# Patient Record
Sex: FEMALE | Race: OTHER | HISPANIC OR LATINO | URBAN - METROPOLITAN AREA
[De-identification: names, ages, dates, MRNs, and addresses within clinical notes are randomized per-mention and may not be internally consistent; named-entity substitution may affect disease eponyms.]

---

## 2019-12-18 ENCOUNTER — EMERGENCY (EMERGENCY)
Facility: HOSPITAL | Age: 24
LOS: 1 days | Discharge: HOME | End: 2019-12-18
Attending: STUDENT IN AN ORGANIZED HEALTH CARE EDUCATION/TRAINING PROGRAM
Payer: COMMERCIAL

## 2019-12-18 VITALS
SYSTOLIC BLOOD PRESSURE: 118 MMHG | WEIGHT: 149.91 LBS | HEART RATE: 93 BPM | OXYGEN SATURATION: 99 % | RESPIRATION RATE: 18 BRPM | TEMPERATURE: 98 F | DIASTOLIC BLOOD PRESSURE: 71 MMHG | HEIGHT: 67 IN

## 2019-12-18 PROCEDURE — 99283 EMERGENCY DEPT VISIT LOW MDM: CPT | Mod: 25

## 2019-12-18 PROCEDURE — 96372 THER/PROPH/DIAG INJ SC/IM: CPT

## 2019-12-18 PROCEDURE — 99285 EMERGENCY DEPT VISIT HI MDM: CPT

## 2019-12-18 RX ORDER — DEXAMETHASONE 0.5 MG/5ML
10 ELIXIR ORAL ONCE
Refills: 0 | Status: COMPLETED | OUTPATIENT
Start: 2019-12-18 | End: 2019-12-18

## 2019-12-18 RX ADMIN — Medication 500 MILLIGRAM(S): at 14:51

## 2019-12-18 RX ADMIN — Medication 1 TABLET(S): at 14:50

## 2019-12-18 RX ADMIN — Medication 10 MILLIGRAM(S): at 14:51

## 2019-12-18 NOTE — ED PROVIDER NOTE - CLINICAL SUMMARY MEDICAL DECISION MAKING FREE TEXT BOX
23 y/o F presents to ED complaining of sore throat. Vitals stable. Exam significant for exudates on lymph nodes. Possible fever despite no fever here in the ER since patient took medications at home. Will treat for strep and laryngitis.

## 2019-12-18 NOTE — ED PROVIDER NOTE - PATIENT PORTAL LINK FT
You can access the FollowMyHealth Patient Portal offered by [facility] by registering at the following website: [email or web address]. By joining [employer]’s FollowMyHealth portal, you will also be able to view your health information using other applications (apps) compatible with our system.

## 2019-12-18 NOTE — ED PROVIDER NOTE - OBJECTIVE STATEMENT
23 y/o F presents to ED complaining of throat pain and voice hoarseness x2d. Pt states she also had a fever and took medications for it at 3p yesterday and today. Pt adds she has abdominal pain. Pt denies nausea, vomiting or any other complaints. NKDA.

## 2020-10-01 NOTE — ED ADULT TRIAGE NOTE - HEIGHT IN CM
DISPLAY PLAN FREE TEXT DISPLAY PLAN FREE TEXT DISPLAY PLAN FREE TEXT DISPLAY PLAN FREE TEXT DISPLAY PLAN FREE TEXT DISPLAY PLAN FREE TEXT DISPLAY PLAN FREE TEXT 170.18 DISPLAY PLAN FREE TEXT

## 2020-11-27 ENCOUNTER — EMERGENCY (EMERGENCY)
Facility: HOSPITAL | Age: 25
LOS: 1 days | Discharge: HOME | End: 2020-11-27
Attending: EMERGENCY MEDICINE
Payer: COMMERCIAL

## 2020-11-27 VITALS
HEART RATE: 71 BPM | OXYGEN SATURATION: 99 % | RESPIRATION RATE: 18 BRPM | SYSTOLIC BLOOD PRESSURE: 103 MMHG | DIASTOLIC BLOOD PRESSURE: 80 MMHG

## 2020-11-27 VITALS
DIASTOLIC BLOOD PRESSURE: 70 MMHG | RESPIRATION RATE: 18 BRPM | HEART RATE: 75 BPM | TEMPERATURE: 98 F | HEIGHT: 67 IN | OXYGEN SATURATION: 99 % | SYSTOLIC BLOOD PRESSURE: 104 MMHG | WEIGHT: 141.1 LBS

## 2020-11-27 PROBLEM — Z78.9 OTHER SPECIFIED HEALTH STATUS: Chronic | Status: ACTIVE | Noted: 2019-12-18

## 2020-11-27 LAB
APPEARANCE UR: CLEAR — SIGNIFICANT CHANGE UP
BILIRUB UR-MCNC: NEGATIVE — SIGNIFICANT CHANGE UP
COLOR SPEC: YELLOW — SIGNIFICANT CHANGE UP
DIFF PNL FLD: NEGATIVE — SIGNIFICANT CHANGE UP
GLUCOSE UR QL: NEGATIVE — SIGNIFICANT CHANGE UP
HCG UR QL: NEGATIVE — SIGNIFICANT CHANGE UP
KETONES UR-MCNC: NEGATIVE — SIGNIFICANT CHANGE UP
LEUKOCYTE ESTERASE UR-ACNC: NEGATIVE — SIGNIFICANT CHANGE UP
NITRITE UR-MCNC: NEGATIVE — SIGNIFICANT CHANGE UP
PH UR: 7 — SIGNIFICANT CHANGE UP (ref 5–8)
PROT UR-MCNC: NEGATIVE — SIGNIFICANT CHANGE UP
RAPID RVP RESULT: DETECTED
SARS-COV-2 RNA SPEC QL NAA+PROBE: DETECTED
SP GR SPEC: 1 — LOW (ref 1.01–1.02)
UROBILINOGEN FLD QL: NEGATIVE — SIGNIFICANT CHANGE UP

## 2020-11-27 PROCEDURE — 81025 URINE PREGNANCY TEST: CPT

## 2020-11-27 PROCEDURE — 0225U NFCT DS DNA&RNA 21 SARSCOV2: CPT

## 2020-11-27 PROCEDURE — 99283 EMERGENCY DEPT VISIT LOW MDM: CPT

## 2020-11-27 PROCEDURE — 81003 URINALYSIS AUTO W/O SCOPE: CPT

## 2020-11-27 PROCEDURE — 87086 URINE CULTURE/COLONY COUNT: CPT

## 2020-11-27 RX ORDER — KETOROLAC TROMETHAMINE 30 MG/ML
15 SYRINGE (ML) INJECTION ONCE
Refills: 0 | Status: DISCONTINUED | OUTPATIENT
Start: 2020-11-27 | End: 2020-11-27

## 2020-11-27 RX ORDER — ACETAMINOPHEN 500 MG
975 TABLET ORAL ONCE
Refills: 0 | Status: COMPLETED | OUTPATIENT
Start: 2020-11-27 | End: 2020-11-27

## 2020-11-27 RX ORDER — SODIUM CHLORIDE 9 MG/ML
1000 INJECTION INTRAMUSCULAR; INTRAVENOUS; SUBCUTANEOUS ONCE
Refills: 0 | Status: COMPLETED | OUTPATIENT
Start: 2020-11-27 | End: 2020-11-27

## 2020-11-27 RX ADMIN — Medication 975 MILLIGRAM(S): at 14:31

## 2020-11-27 RX ADMIN — SODIUM CHLORIDE 1000 MILLILITER(S): 9 INJECTION INTRAMUSCULAR; INTRAVENOUS; SUBCUTANEOUS at 14:31

## 2020-11-27 NOTE — ED PROVIDER NOTE - PROGRESS NOTE DETAILS
Expressing relief, will accept text covid results. Pt is well appearing walking with steady gait, stable for discharge and follow up without fail with medical doctor. I had a detailed discussion with the patient and/or guardian regarding the historical points, exam findings, and any diagnostic results supporting the discharge diagnosis. Pt educated on care and need for follow up. Strict return instructions and red flag signs and symptoms discussed with patient. Questions answered. Pt shows understanding of discharge information and agrees to follow.

## 2020-11-27 NOTE — ED PROVIDER NOTE - PHYSICAL EXAMINATION
Lungs CTA with equal chest rise, speaking in full sentences no increased work of breathing,   PERRL, EOMI, no nystagmus. CN II-XII intact.   Abd soft NT ND with +BS x 4 quads.

## 2020-11-27 NOTE — ED PROVIDER NOTE - CLINICAL SUMMARY MEDICAL DECISION MAKING FREE TEXT BOX
Based on exam and history possible COVID-19, will RVP swab, give fluids, AU/U-preg, analgesia, reassess

## 2020-11-27 NOTE — ED PROVIDER NOTE - PATIENT PORTAL LINK FT
You can access the FollowMyHealth Patient Portal offered by [facility] by registering at the following website: [email or web address]. By joining [employer]’s FollowMyHealth portal, you will also be able to view your health information using other applications (apps) compatible with our system.

## 2020-11-27 NOTE — ED ADULT NURSE NOTE - ED CARDIAC RHYTHM
What Type Of Note Output Would You Prefer (Optional)?: Standard Output How Severe Are Your Spot(S)?: mild Have Your Spot(S) Been Treated In The Past?: has not been treated Hpi Title: Evaluation of Skin Lesions regular

## 2020-11-27 NOTE — ED PROVIDER NOTE - ATTENDING CONTRIBUTION TO CARE
I completed an independent physical examination.   I have signed out the follow up of any pending tests (i.e. labs, radiological studies) to the PA/NP.  I have discussed the patient’s plan of care and disposition with the PA/NP    Patient with URI complaints. Basic labs, COVID screen. Reassess.

## 2020-11-27 NOTE — ED PROVIDER NOTE - OBJECTIVE STATEMENT
26 yo female with no PMHx presenting to ED with complaints of fever to 102 F, malaise, headache and abdominal cramping. Patient states her roommate is COVID + and she has been exposed. She otherwise denies recent travel. Denies cough, SOB, CP, dysuria or vomiting.

## 2020-11-28 LAB
CULTURE RESULTS: NO GROWTH — SIGNIFICANT CHANGE UP
SPECIMEN SOURCE: SIGNIFICANT CHANGE UP

## 2021-05-18 ENCOUNTER — EMERGENCY (EMERGENCY)
Facility: HOSPITAL | Age: 26
LOS: 1 days | Discharge: HOME | End: 2021-05-18
Attending: EMERGENCY MEDICINE
Payer: MEDICAID

## 2021-05-18 VITALS
OXYGEN SATURATION: 99 % | HEART RATE: 76 BPM | TEMPERATURE: 99 F | SYSTOLIC BLOOD PRESSURE: 107 MMHG | RESPIRATION RATE: 16 BRPM | HEIGHT: 67 IN | DIASTOLIC BLOOD PRESSURE: 61 MMHG | WEIGHT: 141.1 LBS

## 2021-05-18 LAB — SARS-COV-2 RNA SPEC QL NAA+PROBE: SIGNIFICANT CHANGE UP

## 2021-05-18 PROCEDURE — 99283 EMERGENCY DEPT VISIT LOW MDM: CPT | Mod: 25

## 2021-05-18 PROCEDURE — 93005 ELECTROCARDIOGRAM TRACING: CPT

## 2021-05-18 PROCEDURE — 99284 EMERGENCY DEPT VISIT MOD MDM: CPT

## 2021-05-18 PROCEDURE — 71046 X-RAY EXAM CHEST 2 VIEWS: CPT

## 2021-05-18 PROCEDURE — 71046 X-RAY EXAM CHEST 2 VIEWS: CPT | Mod: 26

## 2021-05-18 PROCEDURE — 87635 SARS-COV-2 COVID-19 AMP PRB: CPT

## 2021-05-18 RX ORDER — PSEUDOEPHEDRINE HCL 30 MG
60 TABLET ORAL ONCE
Refills: 0 | Status: COMPLETED | OUTPATIENT
Start: 2021-05-18 | End: 2021-05-18

## 2021-05-18 RX ADMIN — Medication 60 MILLIGRAM(S): at 13:28

## 2021-05-18 NOTE — ED PROVIDER NOTE - ATTENDING CONTRIBUTION TO CARE
24 y/o F patient presents to the ED with c/o season allergies vs. viral syndrome. Unremarkable exam. Low suspicion of PE, ACS, dissection, or PNA. Likely season allergies. Will do COVID swab, labs, EKG, Sudafed and reassess.

## 2021-05-18 NOTE — ED PROVIDER NOTE - OBJECTIVE STATEMENT
26 y/o F patient with no significant PMHx presents to the ED with c/o throat pain, nasal congestion, dry cough since last night. Patient reports having anterior chest pain, difficulty breathing intermittently with no aggravating or relieving factors. Patient states taking loratadine. No recent travel, no history of DVT or PE, no FHx of heart no disease at a early age. Patient denies syncope, palpitation, lower extremity swelling, pain, fever, or any other complaints.

## 2021-05-18 NOTE — ED PROVIDER NOTE - PATIENT PORTAL LINK FT
You can access the FollowMyHealth Patient Portal offered by [facility] by registering at the following website: [email or web address]. By joining [employer]’s FollowMyHealth portal, you will also be able to view your health information using other applications (apps) compatible with our system.

## 2021-05-18 NOTE — ED PROVIDER NOTE - PROGRESS NOTE DETAILS
CXR NAD. ECG NSR. Well-appearing. Low suspicion of ACS/PE. PERC negative. Patient's symptoms related to allergic rhinitis/seasonal allergies. Will dc with PMD follow up in 2-3 days. Pt is well appearing walking with steady gait, stable for discharge and follow up without fail with medical doctor. I had a detailed discussion with the patient and/or guardian regarding the historical points, exam findings, and any diagnostic results supporting the discharge diagnosis. Pt educated on care and need for follow up. Strict return instructions and red flag signs and symptoms discussed with patient. Questions answered. Pt shows understanding of discharge information and agrees to follow.

## 2021-05-18 NOTE — ED ADULT NURSE NOTE - OBJECTIVE STATEMENT
As per pt, c/o throat pain, nasal congestion, dry cough, and intermittent CP w/ intermittent difficulty breathing since last night. Pt denies h/a, f/c, and n/v/d. LMP 04/2021.

## 2021-05-18 NOTE — ED PROVIDER NOTE - NSFOLLOWUPINSTRUCTIONS_ED_ALL_ED_FT
Follow up with the primary care doctor in 2-3 days.  If you experience any new or worsening symptoms or if you are concerned you can always come back to the emergency for a re-evaluation.  If there were any prescriptions given to you during the visit today take them as prescribed. If you have any questions you can ask the pharmacist.    Today you will tested for the COVID-19 virus. If you test positive you have to isolate yourself for the next 10 days and then you can come out of isolation as long as you are 3 days without fever (while not taking any medications for fever).    *Having a negative test doesn't guarantee 100% that you don't have COVID especially if you may have contracted the virus in the last few days. It may take time to show on the test. The best thing to do if you are concerned is to isolate yourself.

## 2021-08-12 ENCOUNTER — EMERGENCY (EMERGENCY)
Facility: HOSPITAL | Age: 26
LOS: 1 days | Discharge: HOME | End: 2021-08-12
Attending: STUDENT IN AN ORGANIZED HEALTH CARE EDUCATION/TRAINING PROGRAM
Payer: MEDICAID

## 2021-08-12 VITALS
OXYGEN SATURATION: 98 % | HEIGHT: 67 IN | DIASTOLIC BLOOD PRESSURE: 57 MMHG | TEMPERATURE: 99 F | WEIGHT: 141.1 LBS | HEART RATE: 73 BPM | SYSTOLIC BLOOD PRESSURE: 96 MMHG | RESPIRATION RATE: 17 BRPM

## 2021-08-12 PROCEDURE — 99283 EMERGENCY DEPT VISIT LOW MDM: CPT

## 2021-08-12 RX ORDER — AMOXICILLIN 250 MG/5ML
1 SUSPENSION, RECONSTITUTED, ORAL (ML) ORAL
Qty: 14 | Refills: 0
Start: 2021-08-12 | End: 2021-08-18

## 2021-08-12 RX ORDER — OXYCODONE AND ACETAMINOPHEN 5; 325 MG/1; MG/1
1 TABLET ORAL ONCE
Refills: 0 | Status: DISCONTINUED | OUTPATIENT
Start: 2021-08-12 | End: 2021-08-12

## 2021-08-12 RX ORDER — AMOXICILLIN 250 MG/5ML
875 SUSPENSION, RECONSTITUTED, ORAL (ML) ORAL
Refills: 0 | Status: DISCONTINUED | OUTPATIENT
Start: 2021-08-12 | End: 2021-08-12

## 2021-08-12 RX ADMIN — Medication 1 TABLET(S): at 17:30

## 2021-08-12 RX ADMIN — OXYCODONE AND ACETAMINOPHEN 1 TABLET(S): 5; 325 TABLET ORAL at 17:30

## 2021-08-12 NOTE — ED PROVIDER NOTE - PATIENT PORTAL LINK FT
You can access the FollowMyHealth Patient Portal offered by [facility] by registering at the following website: [email or web address]. By joining [employer]’s FollowMyHealth portal, you will also be able to view your health information using other applications (apps) compatible with our system.

## 2021-08-12 NOTE — ED PROVIDER NOTE - OBJECTIVE STATEMENT
25 y.o female with no PMHx is presenting with x4 days of worsening right ear pain. Pain initiated after going to the beach and water got stuck in patient's ear. Since then, the pain got worse with sensation of a muffled ear. Patient says she feels chills at night and denies n/v, headaches, and difficulty swallowing and chewing. Patient is allergic to Naproxen which results in hives.

## 2021-11-05 ENCOUNTER — EMERGENCY (EMERGENCY)
Facility: HOSPITAL | Age: 26
LOS: 1 days | Discharge: HOME | End: 2021-11-05
Attending: STUDENT IN AN ORGANIZED HEALTH CARE EDUCATION/TRAINING PROGRAM
Payer: MEDICAID

## 2021-11-05 VITALS
DIASTOLIC BLOOD PRESSURE: 62 MMHG | WEIGHT: 152.12 LBS | TEMPERATURE: 99 F | RESPIRATION RATE: 18 BRPM | OXYGEN SATURATION: 100 % | SYSTOLIC BLOOD PRESSURE: 105 MMHG | HEIGHT: 67 IN | HEART RATE: 88 BPM

## 2021-11-05 LAB — SARS-COV-2 RNA SPEC QL NAA+PROBE: SIGNIFICANT CHANGE UP

## 2021-11-05 PROCEDURE — 87635 SARS-COV-2 COVID-19 AMP PRB: CPT

## 2021-11-05 PROCEDURE — 99284 EMERGENCY DEPT VISIT MOD MDM: CPT

## 2021-11-05 PROCEDURE — 99283 EMERGENCY DEPT VISIT LOW MDM: CPT

## 2021-11-05 NOTE — ED PROVIDER NOTE - PHYSICAL EXAMINATION
Gen: AAOx3, non-toxic  Head: NCAT  HEENT: no tonsillar swelling or exudate, EOMI, oral mucosa moist, normal conjunctiva   Lung: CTAB, no respiratory distress, no wheezes/rhonchi/rales B/L, speaking in full sentences  CV: RRR, no murmurs, rubs or gallops  Abd: soft, NTND, no guarding, no CVA tenderness  MSK: no visible deformities  Neuro: No focal sensory or motor deficits, normal CN exam   Skin: Warm, well perfused, no rash  Psych: normal affect.     Meño Ponce DO

## 2021-11-05 NOTE — ED ADULT TRIAGE NOTE - CHIEF COMPLAINT QUOTE
as per the pt " I had fever at home all night " as per the pt " I had fever and sore throat  at home all night "

## 2021-11-05 NOTE — ED PROVIDER NOTE - NS ED ROS FT
ROS:  GENERAL: +fever  EYES: no change in vision  HEENT: no trouble swallowing, no trouble speaking  CARDIAC: no chest pain  PULMONARY: +cough, sore throat, no SOB   GI: no abdominal pain, no nausea, no vomiting, no diarrhea, no constipation  : No dysuria, no frequency, no change in appearance, or odor of urine  SKIN: no rashes  NEURO: no headache, no weakness  MSK: No joint pain    Meño Ponce, DO

## 2021-11-05 NOTE — ED PROVIDER NOTE - OBJECTIVE STATEMENT
26F with no PMH p/w fever, sore throat, and cough since last night. Denies any other symptoms including neck stiffness, HA, chest pain, SOB, abd pain, N/V/D, urinary symptoms, trouble swallowing, change in phonation, ear pain, rash. Room mates have similar symptoms. Pt is vaccinated for COVID.

## 2021-11-05 NOTE — ED PROVIDER NOTE - CLINICAL SUMMARY MEDICAL DECISION MAKING FREE TEXT BOX
26F with no PMH p/w fever, sore throat, and cough since last night. Denies any other symptoms including neck stiffness, HA, chest pain, SOB, abd pain, N/V/D, urinary symptoms, trouble swallowing, change in phonation, ear pain, rash. Room mates have similar symptoms. Pt is vaccinated for COVID. Pt is well appearing, HD stable, normal HEENT exam, clear lungs, low Centor criteria score. Likely viral syndrome. Low suspicion for bacterial sepsis based on exam/history. Will swab for COVID and dc with strict return precautions.

## 2021-11-05 NOTE — ED PROVIDER NOTE - PATIENT PORTAL LINK FT
You can access the FollowMyHealth Patient Portal offered by [facility] by registering at the following website: [email or web address]. By joining [employer]’s FollowMyHealth portal, you will also be able to view your health information using other applications (apps) compatible with our system.

## 2021-11-05 NOTE — ED PROVIDER NOTE - NSFOLLOWUPINSTRUCTIONS_ED_ALL_ED_FT
Follow up with your PCP in 24-48 hours.   You will be texted with your COVID results.   May take Tylenol and Motrin as directed on the bottle for pain control.   Return to the ER if you develop any new or worsening symptoms such as neck stiffness, chest pain, shortness of breath, numbness, weakness, abdominal pain, nausea, vomiting, or visual changes.

## 2021-12-07 NOTE — ED PROVIDER NOTE - PRINCIPAL DIAGNOSIS
What Is The Reason For Today's Visit?: Full Body Skin Examination with No Concerns
What Is The Reason For Today's Visit? (Being Monitored For X): the risk of recurrence of previously treated lesion(s)
Febrile illness

## 2022-02-08 NOTE — ED ADULT NURSE NOTE - SUICIDE SCREENING QUESTION 2
02/08/2022 10:40 am called patient mailbox is full, sent portal message asking her to call our office to reschedule missed appt from yesterday feb 7. still waiting for patient to call back ng.    02/07/2022 11 am called patient left vm and sent portal messge asking her to call our office to reschedule missed appt from today feb 7. waiting for patient to call back ng    I have called patient left voice message and sent portal message. Also send letter.     Can you please reach out to patient    Thank you    No

## 2022-05-12 NOTE — ED ADULT TRIAGE NOTE - NSWEIGHTCALCTOOLDRUG_GEN_A_CORE
ADULT BEHAVIORAL HEALTH FOLLOW UP  Mateosaige GouldMARIBELL  Licensed Independent        Visit Date: 5/12/2022   Time of appointment:  1215-108p   Time spent with Patient: 53 minutes. This is patient's second appointment. Reason for Consult:  Anxiety and Stress     Referring Provider/PCP:    No ref. provider found  Shanthi Chamberlain, APRN - CNP      Pt provided informed consent for the behavioral health program. Discussed with patient model of service to include the limits of confidentiality (i.e. abuse reporting, suicide intervention, etc.) and short-term intervention focused approach. Pt indicated understanding. Duane Beam is a 59 y.o. female who presents for follow up of anxiety and stress. She reports talking to daughter about move, plans to leave in a few weeks. We processed her focus and priorities, she was encouraged to allow a focus on her own needs instead of just those of others. We processed diagnosis of PTSD, differences between this and depression. She states she feels she does not experience hallucinations, simply worry thoughts about safety, she now understands related to past trauma. She was encouraged to ask questions and assert her needs to her psychiatrist, as she does not want to be on her current mental health medications. We reviewed communication options for this to allow for open discussion and understanding. Previous Recommendations: Anam Zafar is agreeable to engage in therapy to work on anxiety and stress coping. She will follow up with Tiana Verde in 2 weeks. MENTAL STATUS EXAM  Mood was anxious with anxious affect. Suicidal ideation was denied. Homicidal ideation was denied. Hygiene was good . Dress was appropriate. Behavior was Within Normal Limits with No observation or self-report of difficulties ambulating. Attitude was Cooperative. Eye-contact was good. Speech: rate - WNL, rhythm - WNL, volume - WNL. Verbalizations were coherent.   Thought processes were intact and goal-oriented without evidence of delusions, hallucinations, obsessions, or christel; with moderate cognitive distortions. Associations were characterized by intact cognitive processes. Pt was oriented to person, place, time, and general circumstances;  recent:  good and remote:  good. Insight and judgment were estimated to be fair, AEB, a fair  understanding of cyclical maladaptive patterns, and the ability to use insight to inform behavior change. ASSESSMENT  Rashawn Orellana presented to the appointment today for evaluation and treatment of symptoms of anxiety. She is currently deemed no risk to herself or others and meets criteria for PTSD. She was in agreement with recommendations. PHQ Scores 1/24/2022 7/25/2018 6/27/2017   PHQ2 Score 0 0 0   PHQ9 Score 0 0 0     Interpretation of Total Score Depression Severity: 1-4 = Minimal depression, 5-9 = Mild depression, 10-14 = Moderate depression, 15-19 = Moderately severe depression, 20-27 = Severe depression    How often pt has had thoughts of death or hurting self (if PHQ positive for depression):       No flowsheet data found. Interpretation of ZAIN-7 score: 5-9 = mild anxiety, 10-14 = moderate anxiety, 15+ = severe anxiety. Recommend referral to behavioral health for scores 10 or greater. DIAGNOSIS  Bree Bautista was seen today for anxiety and stress. Diagnoses and all orders for this visit:    Post traumatic stress disorder          INTERVENTION  Trained in strategies for increasing balanced thinking, Trained in improving communication skills, Discussed self-care (sleep, nutrition, rewarding activities, social support, exercise), Established rapport, Supportive techniques and Provided Psychoeducation re: boundaries, PTSD information      PLAN  Bree Bautista will ask questions about medication options with psychiatrist. She will follow up with Ozzie Siu in 2 weeks. INTERACTIVE COMPLEXITY  Is interactive complexity present? No  Reason:  N/A  Additional Supporting Information:  N/A       Electronically signed by MARIBELL Pineda on 5/12/22 at 12:19 PM EDT  used

## 2022-10-11 NOTE — ED PROVIDER NOTE - CPE EDP ENMT NORM
Patient asking for refill of Estradiol medication. However, it was discontinued 9/21/21. Please confirm dose, order pending approval. Thank you!    estradiol (ESTRACE) 1 MG tablet (Discontinued) 60 tablet 1 1/27/2020 9/21/2021    Sig: Correction:  Using  one tab  5 days per week .      Discontinued by: Blaire Gould NP on 9/21/2021 10:16   Reason: Therapy Completed       Pharmacy: Verified  Last Office Visit: 9/22/22  Related Notes: Hot flashes, menopausal  Continues with rare usage of esterogen as needed  Aware I will refill this as needed like she has been taking  She is aware of her risks with this medication and being over the age of 60  Next Office Visit: was not scheduled       - - -

## 2023-11-01 NOTE — ED PROVIDER NOTE - IV ALTEPLASE EXCL ABS HIDDEN
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 She states when standing she feels that she can place more equal weight through both legs and she is able to stand longer periods. Left knee pain is at 2-3/10 today.       OBJECTIVE                                                                                                                    Observation   Frequent upper extremity assistance needed at parallel bar during tandem walking on airex pads.                       Treatment    Cryotherapy (12066): Cold Pack  - Location: left knee  - Position: long sitting  - Duration: 12 minutes      Therapeutic Exercise  Today's Exercises:      passive range of motion to left knee   Supine Heel Slide with Strap x15   Passive left hamstring stretch 3x20 seconds -deferred this session   Anil Stretch on left with strap 2x30 seconds   Side lying left hip abduction with ankle weight 2 [id number]  Hooklying Clamshell with green theraband x30 -deferred this session   Standing heel raises 2x10  Standing gastroc stretch with 1/2 foam roll  3x30 seconds   Seated left hamstring curl with green theraband 3x10    Next patient visit:    Standing hip extension at parallel bars 2x10      Manual Therapy   Soft tissue mobilization to left knee. Tibiofemoral distraction mobilization. Tibiofemoral anterior posterior mobilization.      Neuromuscular Re-Education  Neuromuscular Re-education to improve quality  of motion , improve posture and postural awareness, improve proprioception, improve balance, improve coordination  and improve kinesthetic sense:        Anterior chain reaction at parallel bars x15 bilateral    Same side rotation chain reaction at parallel bars 2x10 bilateral -deferred this session  Terminal knee extension with ball at wall 20x3 seconds    Lateral stepping with green theraband at knees x2 laps   Monster walks with green theraband at knees x2 laps  Tandem stance on airex at parallel bars 2x30 seconds bilateral   Squats to chair with 2 airex pad  2x10  Scifit (level 4) x5 minutes    Tandem walking on airex pads at parallel bars x4 laps  Step up at parallel bars 4 inch 2x10  New:  Lateral step up and over 4 inch x10  Retro walking at parallel bars x5 laps  valslide retro 2x10      Future progressions:    Geomat 3 point 2x5  Suzi negotiation 6 inch forward and lateral at parallel bars x4 laps each  Willard carry  Valslide retro  Retro walking at speed pulley    Skilled input: verbal instruction/cues and tactile instruction/cues    Writer verbally educated and received verbal consent for hand placement, positioning of patient, and techniques to be performed today from patient for clothing adjustments for techniques, therapist position for techniques and hand placement and palpation for techniques as described above and how they are pertinent to the patient's plan of care.  Home Exercise Program  Access Code: [email or web address]  URL: [email or web address]  Date: 10/19/2023  Prepared by: Elie Munguia    Exercises  - Supine Heel Slide with Strap  - 2 x daily - 7 x weekly - 1 sets - 15 reps - 3 seconds hold  - Supine Quadriceps Stretch with Strap on Table  - 2 x daily - 7 x weekly - 3 sets - 1 reps - 20-30 seconds hold  - Sidelying Hip Abduction  - 1 x daily - 3-4 x weekly - 3 sets - 10 reps  - Hooklying Clamshell with Resistance  - 1 x daily - 3-4 x weekly - 3 sets - 10 reps  - Side  Stepping with Resistance at Ankles  - 1 x daily - 7 x weekly - 3 sets - 10 reps  - Forward Lunge with Back Leg Straight and Counter Support  - 1 x daily - 3 x weekly - 2 sets - 10 reps    10/26/2023   Exercises:  - Backward Tandem Walking with Counter Support  - 1 x daily - 7 x weekly - 3 sets - 10 reps  - Heel Raises with Counter Support  - 1 x daily - 3-4 x weekly - 3 sets - 10 reps      ASSESSMENT                                                                                                            Left knee pain continues to be at posterior left knee with passive left knee flexion which was less with left tibiofemoral joint anterior posterior glide. Patient able to progress to more resistive theraband with seated left hamstring curl without aggravation of pain symptoms. Minimal posterior left knee pain present during anterior chain reaction with left compared to right with minimal reduced pain symptoms. Patient able to perform more repetitions with step up on 4 inch step this session with minimal cueing to reduce left knee genu valgum. Patient able to progress to lateral step up and over at 4 inch step to improve left knee stability in frontal plane. Valslide retro performed this visit to further improve gluteal stability and quadriceps control with some reduced range of motion when right lower extremity is sliding. Patient required frequent upper extremity assistance for balance with progression to tandem stance on compliant surface and tandem walking on compliant surface at parallel bars. Patient reported no increased left knee pain at end of visit today. Ice performed at left knee to manage pain symptoms following exercise.   Education:   - Results of above outlined education: Verbalizes understanding and Demonstrates understanding    PLAN                                                                                                                           Suggestions for next session as indicated:  Continue plan of care to address restrictions identified in initial evaluation contributing to functional deficits including plan of care: activities of daily living, biomechanical training, home program, manual therapy, neuromuscular re-education, therapeutic activities and therapeutic exercise.          Therapy procedure time and total treatment time can be found documented on the Time Entry flowsheet     show
